# Patient Record
Sex: FEMALE | Race: WHITE | NOT HISPANIC OR LATINO | URBAN - METROPOLITAN AREA
[De-identification: names, ages, dates, MRNs, and addresses within clinical notes are randomized per-mention and may not be internally consistent; named-entity substitution may affect disease eponyms.]

---

## 2021-03-09 ENCOUNTER — OFFICE VISIT (OUTPATIENT)
Dept: URBAN - METROPOLITAN AREA CLINIC 70 | Facility: CLINIC | Age: 56
End: 2021-03-09

## 2021-03-23 ENCOUNTER — WEB ENCOUNTER (OUTPATIENT)
Dept: URBAN - METROPOLITAN AREA CLINIC 70 | Facility: CLINIC | Age: 56
End: 2021-03-23

## 2021-03-23 ENCOUNTER — LAB OUTSIDE AN ENCOUNTER (OUTPATIENT)
Dept: URBAN - METROPOLITAN AREA CLINIC 70 | Facility: CLINIC | Age: 56
End: 2021-03-23

## 2021-03-23 ENCOUNTER — OFFICE VISIT (OUTPATIENT)
Dept: URBAN - METROPOLITAN AREA CLINIC 70 | Facility: CLINIC | Age: 56
End: 2021-03-23
Payer: COMMERCIAL

## 2021-03-23 VITALS
SYSTOLIC BLOOD PRESSURE: 108 MMHG | TEMPERATURE: 97.5 F | WEIGHT: 131 LBS | HEIGHT: 67 IN | BODY MASS INDEX: 20.56 KG/M2 | DIASTOLIC BLOOD PRESSURE: 67 MMHG | HEART RATE: 74 BPM

## 2021-03-23 DIAGNOSIS — D18.03 LIVER HEMANGIOMA: ICD-10-CM

## 2021-03-23 DIAGNOSIS — R63.4 ABNORMAL WEIGHT LOSS: ICD-10-CM

## 2021-03-23 PROCEDURE — 99203 OFFICE O/P NEW LOW 30 MIN: CPT | Performed by: NURSE PRACTITIONER

## 2021-03-23 RX ORDER — IBUPROFEN 200 MG
TAKE 2 TABLETS (400 MG) BY ORAL ROUTE EVERY 4-6 HOURS AS NEEDED WITH FOOD TABLET ORAL
Qty: 0 | Refills: 0 | Status: ACTIVE | COMMUNITY
Start: 1900-01-01

## 2021-03-23 RX ORDER — CLOBAZAM 10 MG/1
TAKE 1 TABLET (10 MG) BY ORAL ROUTE ONCE DAILY AT BEDTIME TABLET ORAL 1
Qty: 0 | Refills: 0 | Status: DISCONTINUED | COMMUNITY
Start: 1900-01-01

## 2021-03-23 RX ORDER — LAMOTRIGINE 150 MG/1
TAKE 1 TABLET (150 MG) BY ORAL ROUTE 2 TIMES PER DAY TABLET ORAL 2
Qty: 0 | Refills: 0 | Status: ACTIVE | COMMUNITY
Start: 1900-01-01

## 2021-03-23 RX ORDER — IBUPROFEN SODIUM 256 MG/1
1 TABLET WITH FOOD OR MILK AS NEEDED TABLET, COATED ORAL THREE TIMES A DAY
Status: ACTIVE | COMMUNITY

## 2021-03-29 ENCOUNTER — TELEPHONE ENCOUNTER (OUTPATIENT)
Dept: URBAN - METROPOLITAN AREA CLINIC 70 | Facility: CLINIC | Age: 56
End: 2021-03-29

## 2021-04-02 ENCOUNTER — LAB OUTSIDE AN ENCOUNTER (OUTPATIENT)
Dept: URBAN - METROPOLITAN AREA CLINIC 70 | Facility: CLINIC | Age: 56
End: 2021-04-02

## 2021-04-02 ENCOUNTER — TELEPHONE ENCOUNTER (OUTPATIENT)
Dept: URBAN - METROPOLITAN AREA CLINIC 70 | Facility: CLINIC | Age: 56
End: 2021-04-02

## 2021-04-27 PROBLEM — 300331000: Status: ACTIVE | Noted: 2021-04-02

## 2021-06-29 ENCOUNTER — OFFICE VISIT (OUTPATIENT)
Dept: URBAN - METROPOLITAN AREA SURGERY CENTER 24 | Facility: SURGERY CENTER | Age: 56
End: 2021-06-29

## 2021-09-08 NOTE — HPI-TODAY'S VISIT:
Pt is a 56 yr old female whom presetns for weight loss and liver hemangioma. Pt has hx of liver hemangioma.  Previous CT3 A/P of with liver protocol showed 2 liver hemgiomas to right lobe. One lesion was 3cm x 5 cm and 2nd lesion was 1.9 cm x 1.3 cm.   Pt reports she had recent CT A/P per Dr Arambula which showed hemangiomas.  Recent labs obtained per PCP as well. Pt reports intermittent mild Left sided abdominal pain which was reason for CT A/P. States she has had 25# abnormal wt loss over past 1 yr.  States she has cut sugar out of her diet, but concerned at the amount of weight loss. Denies rectal bleeding or family hx of colon cancer. Reports she had a normal colonoscopy 5 yrs ago. Also had brain surgery at [facility] 9/2019 for a cerebral AVM repair.  States she did have seizures at the time.  She is followed per [facility]ist Dr Tejas Maldonado and has appt f/u with him in next 2 weeks. General

## 2022-03-18 ENCOUNTER — CLAIMS CREATED FROM THE CLAIM WINDOW (OUTPATIENT)
Dept: URBAN - METROPOLITAN AREA CLINIC 94 | Facility: CLINIC | Age: 57
End: 2022-03-18
Payer: COMMERCIAL

## 2022-03-18 ENCOUNTER — DASHBOARD ENCOUNTERS (OUTPATIENT)
Age: 57
End: 2022-03-18

## 2022-03-18 VITALS
WEIGHT: 145 LBS | DIASTOLIC BLOOD PRESSURE: 59 MMHG | SYSTOLIC BLOOD PRESSURE: 111 MMHG | HEART RATE: 67 BPM | TEMPERATURE: 97.5 F | BODY MASS INDEX: 22.76 KG/M2 | HEIGHT: 67 IN

## 2022-03-18 DIAGNOSIS — K59.00 CONSTIPATION, UNSPECIFIED CONSTIPATION TYPE: ICD-10-CM

## 2022-03-18 DIAGNOSIS — R19.4 CHANGE IN BOWEL HABITS: ICD-10-CM

## 2022-03-18 PROCEDURE — 99213 OFFICE O/P EST LOW 20 MIN: CPT | Performed by: PHYSICIAN ASSISTANT

## 2022-03-18 PROCEDURE — 99213 OFFICE O/P EST LOW 20 MIN: CPT | Performed by: INTERNAL MEDICINE

## 2022-03-18 RX ORDER — IBUPROFEN 200 MG
TAKE 2 TABLETS (400 MG) BY ORAL ROUTE EVERY 4-6 HOURS AS NEEDED WITH FOOD TABLET ORAL
Qty: 0 | Refills: 0 | Status: ACTIVE | COMMUNITY
Start: 1900-01-01

## 2022-03-18 RX ORDER — LAMOTRIGINE 100 MG/1
1 TABLET TABLET, ORALLY DISINTEGRATING ORAL ONCE A DAY
Refills: 0 | Status: ACTIVE | COMMUNITY
Start: 1900-01-01

## 2022-03-18 NOTE — HPI-TODAY'S VISIT:
56 yo F with hx of change in bowel habits with constipation.   Pt reports a 1 mos hx of change in bowel habits with small hard balls. She denies melena or hematochezia. She does reports LUQ pain which is relieved with BM. Pt reports drinking a gallon of water daily. She denies routine excercise but will restart this. She denies infection, new medications and tried to eat a high fiber diet. Pt does not wish to take medication.  Pt had colonoscopy in 2016 normal. 10 yr surveillance  Pt has a gluten intolerance. Gluten Free 2019.  Pt is tapering off of Lamotrigine 25 mg taper for hx of seizure disorder.   Also had brain surgery at [place] 9/2019 for a cerebral AVM repair with hx of seizures. She has been seizure free since surgery which is why she is tapering off Lamotrigine.

## 2022-03-20 PROBLEM — 14760008: Status: ACTIVE | Noted: 2022-03-20

## 2022-05-03 ENCOUNTER — OFFICE VISIT (OUTPATIENT)
Dept: URBAN - METROPOLITAN AREA CLINIC 94 | Facility: CLINIC | Age: 57
End: 2022-05-03

## 2022-05-03 RX ORDER — LAMOTRIGINE 100 MG/1
1 TABLET TABLET, ORALLY DISINTEGRATING ORAL ONCE A DAY
Refills: 0 | Status: ACTIVE | COMMUNITY
Start: 1900-01-01

## 2022-05-03 RX ORDER — IBUPROFEN 200 MG
TAKE 2 TABLETS (400 MG) BY ORAL ROUTE EVERY 4-6 HOURS AS NEEDED WITH FOOD TABLET ORAL
Qty: 0 | Refills: 0 | Status: ACTIVE | COMMUNITY
Start: 1900-01-01

## 2022-07-05 ENCOUNTER — OFFICE VISIT (OUTPATIENT)
Dept: URBAN - METROPOLITAN AREA SURGERY CENTER 24 | Facility: SURGERY CENTER | Age: 57
End: 2022-07-05
Payer: COMMERCIAL

## 2022-07-05 DIAGNOSIS — R19.4 ALTERATION IN BOWEL ELIMINATION: ICD-10-CM

## 2022-07-05 PROCEDURE — 45378 DIAGNOSTIC COLONOSCOPY: CPT | Performed by: INTERNAL MEDICINE

## 2022-07-05 PROCEDURE — G8907 PT DOC NO EVENTS ON DISCHARG: HCPCS | Performed by: INTERNAL MEDICINE

## 2022-07-05 RX ORDER — IBUPROFEN 200 MG
TAKE 2 TABLETS (400 MG) BY ORAL ROUTE EVERY 4-6 HOURS AS NEEDED WITH FOOD TABLET ORAL
Qty: 0 | Refills: 0 | Status: ACTIVE | COMMUNITY
Start: 1900-01-01

## 2022-07-05 RX ORDER — LAMOTRIGINE 100 MG/1
1 TABLET TABLET, ORALLY DISINTEGRATING ORAL ONCE A DAY
Refills: 0 | Status: ACTIVE | COMMUNITY
Start: 1900-01-01

## 2023-02-01 NOTE — PHYSICAL EXAM GASTROINTESTINAL
Abdomen , soft, nontender, nondistended , no guarding or rigidity , no masses palpable , normal bowel sounds , Liver and Spleen , no hepatomegaly present , no hepatosplenomegaly , liver nontender , spleen not palpable Detail Level: Detailed